# Patient Record
Sex: MALE | Race: BLACK OR AFRICAN AMERICAN | Employment: FULL TIME | URBAN - METROPOLITAN AREA
[De-identification: names, ages, dates, MRNs, and addresses within clinical notes are randomized per-mention and may not be internally consistent; named-entity substitution may affect disease eponyms.]

---

## 2017-01-05 ENCOUNTER — OFFICE VISIT (OUTPATIENT)
Dept: FAMILY MEDICINE CLINIC | Facility: CLINIC | Age: 37
End: 2017-01-05

## 2017-01-05 VITALS
SYSTOLIC BLOOD PRESSURE: 137 MMHG | BODY MASS INDEX: 27.78 KG/M2 | OXYGEN SATURATION: 96 % | TEMPERATURE: 97.4 F | HEIGHT: 75 IN | WEIGHT: 223.4 LBS | HEART RATE: 87 BPM | DIASTOLIC BLOOD PRESSURE: 82 MMHG | RESPIRATION RATE: 16 BRPM

## 2017-01-05 DIAGNOSIS — R10.10 PAIN OF UPPER ABDOMEN: ICD-10-CM

## 2017-01-05 DIAGNOSIS — R10.10 PAIN OF UPPER ABDOMEN: Primary | ICD-10-CM

## 2017-01-05 LAB
BILIRUB UR QL STRIP: NEGATIVE
GLUCOSE UR-MCNC: NEGATIVE MG/DL
KETONES P FAST UR STRIP-MCNC: NORMAL MG/DL
PH UR STRIP: 5.5 [PH] (ref 4.6–8)
PROT UR QL STRIP: NORMAL MG/DL
SP GR UR STRIP: 1.03 (ref 1–1.03)
UA UROBILINOGEN AMB POC: NORMAL (ref 0.2–1)
URINALYSIS CLARITY POC: CLEAR
URINALYSIS COLOR POC: YELLOW
URINE BLOOD POC: NORMAL
URINE LEUKOCYTES POC: NEGATIVE
URINE NITRITES POC: NEGATIVE

## 2017-01-05 RX ORDER — HYDROCODONE BITARTRATE AND ACETAMINOPHEN 5; 325 MG/1; MG/1
1 TABLET ORAL
Qty: 40 TAB | Refills: 0 | Status: SHIPPED | OUTPATIENT
Start: 2017-01-05 | End: 2018-02-27 | Stop reason: ALTCHOICE

## 2017-01-05 NOTE — LETTER
NOTIFICATION RETURN TO WORK / SCHOOL 
 
1/5/2017 10:56 AM 
 
Mr. Leslie Cisneros [address] [place] [id number] To Whom It May Concern: 
 
Leslie Cisneros is currently under the care of Tatiana Mondragon. He will return to work/school on: 1/9/17 If there are questions or concerns please have the patient contact our office.  
 
 
 
Sincerely, 
 
 
Annmarie Friedman NP

## 2017-01-05 NOTE — LETTER
2/15/2017 1:43 PM 
 
Mr. Lilly Bravo [address] [place] [phone] Dear Lilly Bravo: We have been trying to contact you! We have lab and/or imaging results we would like to share with you. Please call our office for results and to update your contact information. Our office number is [phone]. We look forward to hearing from you!  
 
 
Sincerely, 
 
 
Eusebio Mazariegos NP

## 2017-01-05 NOTE — MR AVS SNAPSHOT
Visit Information Date & Time Provider Department Dept. Phone Encounter #  
 1/5/2017 10:15 AM Lurdes Lizarraga NP [facility] [phone] [id number] Upcoming Health Maintenance Date Due Pneumococcal 19-64 Medium Risk (1 of 1 - PPSV23) 1/2/1997 DTaP/Tdap/Td series (1 - Tdap) 1/2/1999 INFLUENZA AGE 9 TO ADULT 8/1/2016 Allergies as of 1/5/2017  Review Complete On: 1/5/2017 By: Vivian Dietz No Known Allergies Current Immunizations  Never Reviewed No immunizations on file. Not reviewed this visit You Were Diagnosed With   
  
 Codes Comments Pain of upper abdomen    -  Primary ICD-10-CM: R10.10 ICD-9-CM: 789.09 Vitals BP Pulse Temp Resp Height(growth percentile) Weight(growth percentile) 137/82 87 97.4 °F (36.3 °C) 16 6' 3\" (1.905 m) 223 lb 6.4 oz (101.3 kg) SpO2 BMI Smoking Status 96% 27.92 kg/m2 Current Every Day Smoker Vitals History BMI and BSA Data Body Mass Index Body Surface Area  
 27.92 kg/m 2 2.32 m 2 Preferred Pharmacy Pharmacy Name Phone [facility] [address] [place] [address] [phone] Your Updated Medication List  
  
   
This list is accurate as of: 1/5/17 10:43 AM.  Always use your most recent med list.  
  
  
  
  
 butalbital-acetaminophen-caffeine -40 mg per tablet Commonly known as:  Ruthie Adams Take 1 Tab by mouth every four (4) hours as needed for Pain. Max Daily Amount: 6 Tabs. HYDROcodone-acetaminophen 5-325 mg per tablet Commonly known as:  Mario Bent Take 1 Tab by mouth every six (6) hours as needed for Pain. Max Daily Amount: 4 Tabs. ibuprofen 800 mg tablet Commonly known as:  MOTRIN Take  by mouth every eight (8) hours as needed. Prescriptions Printed Refills  HYDROcodone-acetaminophen (NORCO) 5-325 mg per tablet 0  
 Sig: Take 1 Tab by mouth every six (6) hours as needed for Pain. Max Daily Amount: 4 Tabs. Class: Print Route: Oral  
  
To-Do List   
 01/05/2017 Lab:  LIPASE   
  
 01/05/2017 Lab:  METABOLIC PANEL, COMPREHENSIVE   
  
 01/12/2017 Imaging:  CT ABD PELV W CONT Referral Information Referral ID Referred By Referred To  
  
 [id number] Lucrecia NAQVI Not Available Visits Status Start Date End Date 1 New Request 1/5/17 1/5/18 If your referral has a status of pending review or denied, additional information will be sent to support the outcome of this decision. Patient Instructions Abdominal Pain: Care Instructions Your Care Instructions Abdominal pain has many possible causes. Some aren't serious and get better on their own in a few days. Others need more testing and treatment. If your pain continues or gets worse, you need to be rechecked and may need more tests to find out what is wrong. You may need surgery to correct the problem. Don't ignore new symptoms, such as fever, nausea and vomiting, urination problems, pain that gets worse, and dizziness. These may be signs of a more serious problem. Your doctor may have recommended a follow-up visit in the next 8 to 12 hours. If you are not getting better, you may need more tests or treatment. The doctor has checked you carefully, but problems can develop later. If you notice any problems or new symptoms, get medical treatment right away. Follow-up care is a key part of your treatment and safety. Be sure to make and go to all appointments, and call your doctor if you are having problems. It's also a good idea to know your test results and keep a list of the medicines you take. How can you care for yourself at home? · Rest until you feel better. · To prevent dehydration, drink plenty of fluids, enough so that your urine is light yellow or clear like water. Choose water and other caffeine-free clear liquids until you feel better. If you have kidney, heart, or liver disease and have to limit fluids, talk with your doctor before you increase the amount of fluids you drink. · If your stomach is upset, eat mild foods, such as rice, dry toast or crackers, bananas, and applesauce. Try eating several small meals instead of two or three large ones. · Wait until 48 hours after all symptoms have gone away before you have spicy foods, alcohol, and drinks that contain caffeine. · Do not eat foods that are high in fat. · Avoid anti-inflammatory medicines such as aspirin, ibuprofen (Advil, Motrin), and naproxen (Aleve). These can cause stomach upset. Talk to your doctor if you take daily aspirin for another health problem. When should you call for help? Call 911 anytime you think you may need emergency care. For example, call if: 
· You passed out (lost consciousness). · You pass maroon or very bloody stools. · You vomit blood or what looks like coffee grounds. · You have new, severe belly pain. Call your doctor now or seek immediate medical care if: 
· Your pain gets worse, especially if it becomes focused in one area of your belly. · You have a new or higher fever. · Your stools are black and look like tar, or they have streaks of blood. · You have unexpected vaginal bleeding. · You have symptoms of a urinary tract infection. These may include: 
¨ Pain when you urinate. ¨ Urinating more often than usual. 
¨ Blood in your urine. · You are dizzy or lightheaded, or you feel like you may faint. Watch closely for changes in your health, and be sure to contact your doctor if: 
· You are not getting better after 1 day (24 hours). Where can you learn more? Go to http://stella-lorenzo.info/. Enter U876 in the search box to learn more about \"Abdominal Pain: Care Instructions. \" Current as of: May 27, 2016 Content Version: 11.1 © 8364-5329 "I [employer]. Care instructions adapted under license by [employer] (which disclaims liability or warranty for this information). If you have questions about a medical condition or this instruction, always ask your healthcare professional. [address] any warranty or liability for your use of this information. Introducing hospitals & HEALTH SERVICES! Lmred Armenta introduces [employer] patient portal. Now you can access parts of your medical record, email your doctor's office, and request medication refills online. 1. In your internet browser, go to [email or web address] 2. Click on the First Time User? Click Here link in the Sign In box. You will see the New Member Sign Up page. 3. Enter your [employer] Access Code exactly as it appears below. You will not need to use this code after youve completed the sign-up process. If you do not sign up before the expiration date, you must request a new code. · [employer] Access Code: [email or web address] Expires: 4/5/2017 10:36 AM 
 
4. Enter the last four digits of your Social Security Number (xxxx) and Date of Birth (mm/dd/yyyy) as indicated and click Submit. You will be taken to the next sign-up page. 5. Create a [employer] ID. This will be your [employer] login ID and cannot be changed, so think of one that is secure and easy to remember. 6. Create a [employer] password. You can change your password at any time. 7. Enter your Password Reset Question and Answer. This can be used at a later time if you forget your password. 8. Enter your e-mail address. You will receive e-mail notification when new information is available in [address] 9. Click Sign Up. You can now view and download portions of your medical record. 10. Click the Download Summary menu link to download a portable copy of your medical information. If you have questions, please visit the Frequently Asked Questions section of the [employer] website. Remember, [employer] is NOT to be used for urgent needs. For medical emergencies, dial 911. Now available from your iPhone and Android! Please provide this summary of care documentation to your next provider. Your primary care clinician is listed as [facility]. If you have any questions after today's visit, please call [phone].

## 2017-01-05 NOTE — PATIENT INSTRUCTIONS

## 2017-01-05 NOTE — PROGRESS NOTES
HISTORY OF PRESENT ILLNESS  Tay Bean is a 42 y.o. male. HPI Comments: Acute care visit with c/o a 4 day h/o abdominal pain. Pain is mainly located in the RUQ with radiation to the back. Reports fever, nausea and vomiting. Denies any change in medication. BM normal, no diarrhea or constipation. Remembers eating a typical breakfast prior to symptoms began (sussage, toast, eggs, fournier). Has not been able to eat much due to his abdominal pain. Had chicken noodle soup last night. Denies any abdominal surgery or h/o kidney stones. Abdominal Pain   The history is provided by the patient. This is a new problem. The current episode started more than 2 days ago. The problem occurs daily. The problem has not changed since onset. Associated symptoms include abdominal pain. He has tried nothing for the symptoms. Review of Systems   Gastrointestinal: Positive for abdominal pain, nausea and vomiting. Past Medical History   Diagnosis Date    HA (headache)     Migraine      No past surgical history on file. Current Outpatient Prescriptions on File Prior to Visit   Medication Sig Dispense Refill    ibuprofen (MOTRIN) 800 mg tablet Take  by mouth every eight (8) hours as needed.  butalbital-acetaminophen-caffeine (FIORICET, ESGIC) -40 mg per tablet Take 1 Tab by mouth every four (4) hours as needed for Pain. Max Daily Amount: 6 Tabs. 30 Tab 6     No current facility-administered medications on file prior to visit. Allergies and Intolerances:   No Known Allergies    Family History:   Family History   Problem Relation Age of Onset    Cancer Mother      breast ca    Heart Disease Father     Diabetes Father     Diabetes Maternal Aunt     Diabetes Maternal Grandmother     Alcohol abuse Maternal Grandfather        Social History:   He  reports that he has been smoking. He has been smoking about 0.25 packs per day.  He has never used smokeless tobacco. He  reports that he does not drink alcohol. Vitals:   Visit Vitals    /82    Pulse 87    Temp 97.4 °F (36.3 °C)    Resp 16    Ht 6' 3\" (1.905 m)    Wt 223 lb 6.4 oz (101.3 kg)    SpO2 96%    BMI 27.92 kg/m2     Body surface area is 2.32 meters squared. Physical Exam   Constitutional: He is oriented to person, place, and time. He appears well-developed and well-nourished. Cardiovascular: Normal rate. Pulmonary/Chest: Effort normal.   Abdominal: Soft. He exhibits no distension. There is no hepatosplenomegaly. There is tenderness in the right upper quadrant. There is no rigidity, no guarding, no tenderness at McBurney's point and negative Myers's sign. Neurological: He is alert and oriented to person, place, and time. Skin: Skin is warm. Psychiatric: He has a normal mood and affect. Nursing note and vitals reviewed. ASSESSMENT and PLAN    ICD-10-CM ICD-9-CM    1. Pain of upper abdomen R10.10 789.09 AMB POC URINALYSIS DIP STICK AUTO W/O MICRO      CBC WITH AUTOMATED DIFF      METABOLIC PANEL, COMPREHENSIVE      LIPASE      CT ABD PELV W CONT      HYDROcodone-acetaminophen (NORCO) 5-325 mg per tablet     -  Urinalysis normal/unramarkable. - Discussed possible gallstones and kidney stones with patient. Will order CT abd pelvis to r/o gallstones. Hydrocodone every 6 hrs PRN. Follow-up Disposition:  Return if symptoms worsen or fail to improve.  lab results and schedule of future lab studies reviewed with patient  reviewed medications and side effects in detail  radiology results and schedule of future radiology studies reviewed with patient    - Alarm signals discussed. ER precautions  - Plan of care reviewed with patient. Understanding verbalized and they are in agreement with plan of care.

## 2017-01-06 LAB
ALBUMIN SERPL-MCNC: 5.1 G/DL (ref 3.5–5.5)
ALBUMIN/GLOB SERPL: 2.1 {RATIO} (ref 1.1–2.5)
ALP SERPL-CCNC: 57 IU/L (ref 39–117)
ALT SERPL-CCNC: 34 IU/L (ref 0–44)
AST SERPL-CCNC: 18 IU/L (ref 0–40)
BASOPHILS # BLD AUTO: 0 X10E3/UL (ref 0–0.2)
BASOPHILS NFR BLD AUTO: 0 %
BILIRUB SERPL-MCNC: 1.9 MG/DL (ref 0–1.2)
BUN SERPL-MCNC: 21 MG/DL (ref 6–20)
BUN/CREAT SERPL: 23 (ref 8–19)
CALCIUM SERPL-MCNC: 10.3 MG/DL (ref 8.7–10.2)
CHLORIDE SERPL-SCNC: 104 MMOL/L (ref 96–106)
CO2 SERPL-SCNC: 20 MMOL/L (ref 18–29)
CREAT SERPL-MCNC: 0.93 MG/DL (ref 0.76–1.27)
EOSINOPHIL # BLD AUTO: 0.1 X10E3/UL (ref 0–0.4)
EOSINOPHIL NFR BLD AUTO: 1 %
ERYTHROCYTE [DISTWIDTH] IN BLOOD BY AUTOMATED COUNT: 13 % (ref 12.3–15.4)
GLOBULIN SER CALC-MCNC: 2.4 G/DL (ref 1.5–4.5)
GLUCOSE SERPL-MCNC: 115 MG/DL (ref 65–99)
HCT VFR BLD AUTO: 43.9 % (ref 37.5–51)
HGB BLD-MCNC: 14.4 G/DL (ref 12.6–17.7)
IMM GRANULOCYTES # BLD: 0 X10E3/UL (ref 0–0.1)
IMM GRANULOCYTES NFR BLD: 0 %
LIPASE SERPL-CCNC: 20 U/L (ref 0–59)
LYMPHOCYTES # BLD AUTO: 2.6 X10E3/UL (ref 0.7–3.1)
LYMPHOCYTES NFR BLD AUTO: 35 %
MCH RBC QN AUTO: 31.2 PG (ref 26.6–33)
MCHC RBC AUTO-ENTMCNC: 32.8 G/DL (ref 31.5–35.7)
MCV RBC AUTO: 95 FL (ref 79–97)
MONOCYTES # BLD AUTO: 0.8 X10E3/UL (ref 0.1–0.9)
MONOCYTES NFR BLD AUTO: 11 %
NEUTROPHILS # BLD AUTO: 3.9 X10E3/UL (ref 1.4–7)
NEUTROPHILS NFR BLD AUTO: 53 %
PLATELET # BLD AUTO: 279 X10E3/UL (ref 150–379)
POTASSIUM SERPL-SCNC: 4.2 MMOL/L (ref 3.5–5.2)
PROT SERPL-MCNC: 7.5 G/DL (ref 6–8.5)
RBC # BLD AUTO: 4.62 X10E6/UL (ref 4.14–5.8)
SODIUM SERPL-SCNC: 145 MMOL/L (ref 134–144)
WBC # BLD AUTO: 7.4 X10E3/UL (ref 3.4–10.8)

## 2017-01-19 ENCOUNTER — TELEPHONE (OUTPATIENT)
Dept: FAMILY MEDICINE CLINIC | Facility: CLINIC | Age: 37
End: 2017-01-19

## 2017-01-19 NOTE — TELEPHONE ENCOUNTER
Called patient at [phone] (home)  (non-secure line) and did not leave a detailed message. Patient needs to be informed of lab results.

## 2017-01-19 NOTE — TELEPHONE ENCOUNTER
----- Message from Rubio Delgado NP sent at 1/16/2017 11:01 AM EST -----  Unremarkable labs. pls notify patient.  Thanks

## 2017-06-19 ENCOUNTER — OFFICE VISIT (OUTPATIENT)
Dept: FAMILY MEDICINE CLINIC | Age: 37
End: 2017-06-19

## 2017-06-19 VITALS
HEART RATE: 64 BPM | HEIGHT: 75 IN | DIASTOLIC BLOOD PRESSURE: 100 MMHG | WEIGHT: 224.6 LBS | TEMPERATURE: 98.1 F | OXYGEN SATURATION: 98 % | SYSTOLIC BLOOD PRESSURE: 128 MMHG | RESPIRATION RATE: 24 BRPM | BODY MASS INDEX: 27.93 KG/M2

## 2017-06-19 DIAGNOSIS — K21.9 GASTROESOPHAGEAL REFLUX DISEASE WITHOUT ESOPHAGITIS: Primary | ICD-10-CM

## 2017-06-19 RX ORDER — PANTOPRAZOLE SODIUM 40 MG/1
40 TABLET, DELAYED RELEASE ORAL DAILY
Qty: 30 TAB | Refills: 5 | Status: SHIPPED | OUTPATIENT
Start: 2017-06-19 | End: 2018-02-27 | Stop reason: SDUPTHER

## 2017-06-19 NOTE — LETTER
NOTIFICATION OF RETURN TO WORK / SCHOOL 
 
6/19/2017 3:07 PM 
 
Mr. Luke Gabriel [address] [place] [phone] Count includes the [facility] To Whom It May Concern: 
 
Luke Gabriel was under the care of [address] [place] [address] from 6-19-17 thru 6-20-17. He will be able to return to work/school on 6-2117 If there are questions or concerns please have the patient contact our office. Sincerely, Bc Vale NP

## 2017-06-19 NOTE — PROGRESS NOTES
HISTORY OF PRESENT ILLNESS  Nicola Mercedes is a 42 y.o. male. Pt presents today with 2 weeks of stomach pains. Pt avoids certain foods. He denies fever, chills, constipation or diarrhea. He has not done anything new or eaten anything new. He does do heavy lifting of laundry. Abdominal Pain   The history is provided by the patient. The current episode started more than 1 week ago. The problem has not changed since onset. Associated symptoms include chest pain and abdominal pain. Pertinent negatives include no headaches and no shortness of breath. Nothing relieves the symptoms. He has tried nothing for the symptoms. Chest Pain    Associated symptoms include abdominal pain. Pertinent negatives include no fever, no headaches, no nausea, no shortness of breath and no vomiting. Review of Systems   Constitutional: Negative for chills and fever. Respiratory: Negative for shortness of breath. Cardiovascular: Positive for chest pain. Gastrointestinal: Positive for abdominal pain. Negative for constipation, diarrhea, heartburn, nausea and vomiting. Skin: Negative. Neurological: Negative for headaches. Physical Exam   Constitutional: He appears well-developed. No distress. Neck: Neck supple. Cardiovascular: Normal rate, regular rhythm and normal heart sounds. No murmur heard. Pulmonary/Chest: Effort normal and breath sounds normal. No respiratory distress. He has no wheezes. He has no rales. Abdominal: Soft. He exhibits no distension and no mass. There is tenderness. There is no rebound and no guarding. Musculoskeletal: Normal range of motion. ASSESSMENT and PLAN    ICD-10-CM ICD-9-CM    1. Gastroesophageal reflux disease without esophagitis K21.9 530.81 pantoprazole (PROTONIX) 40 mg tablet       PLAN:  We discussed his symptoms and the causes. We discussed the symptoms of GERD and treatment options.     Pt is to call if symptoms persist or wors

## 2017-06-19 NOTE — MR AVS SNAPSHOT
Visit Information Date & Time Provider Department Dept. Phone Encounter #  
 6/19/2017  2:30 PM Maria M Candelaria  [facility] [phone] [id number] Upcoming Health Maintenance Date Due DTaP/Tdap/Td series (1 - Tdap) 1/2/1999 INFLUENZA AGE 9 TO ADULT 8/1/2017 Allergies as of 6/19/2017  Review Complete On: 6/19/2017 By: Maria M Candelaria NP No Known Allergies Current Immunizations  Never Reviewed No immunizations on file. Not reviewed this visit You Were Diagnosed With   
  
 Codes Comments Gastroesophageal reflux disease without esophagitis    -  Primary ICD-10-CM: K21.9 ICD-9-CM: 530.81 Vitals BP Pulse Temp Resp Height(growth percentile) Weight(growth percentile) (!) 128/100 (BP 1 Location: Left arm, BP Patient Position: Sitting) 64 98.1 °F (36.7 °C) (Oral) 24 6' 3\" (1.905 m) 224 lb 9.6 oz (101.9 kg) SpO2 BMI Smoking Status 98% 28.07 kg/m2 Current Every Day Smoker Vitals History BMI and BSA Data Body Mass Index Body Surface Area 28.07 kg/m 2 2.32 m 2 Preferred Pharmacy Pharmacy Name Phone [facility] [address] [phone] Your Updated Medication List  
  
   
This list is accurate as of: 6/19/17  3:12 PM.  Always use your most recent med list.  
  
  
  
  
 HYDROcodone-acetaminophen 5-325 mg per tablet Commonly known as:  Shaniqua Mandril Take 1 Tab by mouth every six (6) hours as needed for Pain. Max Daily Amount: 4 Tabs. pantoprazole 40 mg tablet Commonly known as:  PROTONIX Take 1 Tab by mouth daily. Prescriptions Sent to Pharmacy Refills  
 pantoprazole (PROTONIX) 40 mg tablet 5 Sig: Take 1 Tab by mouth daily. Class: Normal  
 Pharmacy: [facility] [address] [facility] #: [phone] Route: Oral  
  
Introducing [facility] SERVICES! Nila Hernandez introduces "[employer]" patient portal. Now you can access parts of your medical record, email your doctor's office, and request medication refills online. 1. In your internet browser, go to [email or web address] 2. Click on the First Time User? Click Here link in the Sign In box. You will see the New Member Sign Up page. 3. Enter your "[employer]" Access Code exactly as it appears below. You will not need to use this code after youve completed the sign-up process. If you do not sign up before the expiration date, you must request a new code. · "[employer]" Access Code: [email or web address] Expires: 9/17/2017  3:03 PM 
 
4. Enter the last four digits of your Social Security Number (xxxx) and Date of Birth (mm/dd/yyyy) as indicated and click Submit. You will be taken to the next sign-up page. 5. Create a "[employer]" ID. This will be your "[employer]" login ID and cannot be changed, so think of one that is secure and easy to remember. 6. Create a "[employer]" password. You can change your password at any time. 7. Enter your Password Reset Question and Answer. This can be used at a later time if you forget your password. 8. Enter your e-mail address. You will receive e-mail notification when new information is available in [address] 9. Click Sign Up. You can now view and download portions of your medical record. 10. Click the Download Summary menu link to download a portable copy of your medical information. If you have questions, please visit the Frequently Asked Questions section of the "[employer]" website. Remember, "[employer]" is NOT to be used for urgent needs. For medical emergencies, dial 911. Now available from your iPhone and Android! Please provide this summary of care documentation to your next provider. Your primary care clinician is listed as [address]. If you have any questions after today's visit, please call [phone].

## 2017-06-19 NOTE — PROGRESS NOTES
1. Have you been to the ER, urgent care clinic since your last visit? Hospitalized since your last visit? No    2. Have you seen or consulted any other health care providers outside of the [address] [place] [address] since your last visit? Include any pap smears or colon screening.  No

## 2018-02-27 ENCOUNTER — OFFICE VISIT (OUTPATIENT)
Dept: FAMILY MEDICINE CLINIC | Age: 38
End: 2018-02-27

## 2018-02-27 VITALS
RESPIRATION RATE: 16 BRPM | WEIGHT: 239 LBS | HEART RATE: 66 BPM | BODY MASS INDEX: 29.72 KG/M2 | OXYGEN SATURATION: 97 % | HEIGHT: 75 IN | DIASTOLIC BLOOD PRESSURE: 70 MMHG | TEMPERATURE: 97.8 F | SYSTOLIC BLOOD PRESSURE: 110 MMHG

## 2018-02-27 DIAGNOSIS — M79.671 PAIN IN BOTH FEET: ICD-10-CM

## 2018-02-27 DIAGNOSIS — G43.C0 PERIODIC HEADACHE SYNDROME, NOT INTRACTABLE: ICD-10-CM

## 2018-02-27 DIAGNOSIS — M79.672 PAIN IN BOTH FEET: ICD-10-CM

## 2018-02-27 DIAGNOSIS — R07.9 CHEST PAIN, UNSPECIFIED TYPE: Primary | ICD-10-CM

## 2018-02-27 RX ORDER — PANTOPRAZOLE SODIUM 40 MG/1
40 TABLET, DELAYED RELEASE ORAL DAILY
Qty: 30 TAB | Refills: 6 | Status: SHIPPED | OUTPATIENT
Start: 2018-02-27 | End: 2018-09-18 | Stop reason: SDUPTHER

## 2018-02-27 RX ORDER — BUTALBITAL, ACETAMINOPHEN AND CAFFEINE 50; 325; 40 MG/1; MG/1; MG/1
1 TABLET ORAL
Qty: 30 TAB | Refills: 3 | Status: SHIPPED | OUTPATIENT
Start: 2018-02-27 | End: 2020-10-08

## 2018-02-27 RX ORDER — ACETAMINOPHEN 500 MG
TABLET ORAL
COMMUNITY
End: 2018-02-27 | Stop reason: SDUPTHER

## 2018-02-27 NOTE — MR AVS SNAPSHOT
Shawnia Cady 
 
 
 [address], [place] [address] [address] [facility] [address] 
[phone] Patient: Amanda Araujo MRN: [id number] [facility]:[birth year] Visit Information Date & Time Provider Department Dept. Phone Encounter #  
 2/27/2018  8:40 AM Chandan Mendenhall, [address] [place] [address] [phone] [id number] Follow-up Instructions Return in about 8 weeks (around 4/24/2018) for ha/cp/foot pain. Upcoming Health Maintenance Date Due DTaP/Tdap/Td series (1 - Tdap) 1/2/1999 Influenza Age 5 to Adult 8/1/2017 Allergies as of 2/27/2018  Review Complete On: 2/27/2018 By: Maine Rousseau LPN No Known Allergies Current Immunizations  Never Reviewed No immunizations on file. Not reviewed this visit You Were Diagnosed With   
  
 Codes Comments Chest pain, unspecified type    -  Primary ICD-10-CM: R07.9 ICD-9-CM: 786.50 Periodic headache syndrome, not intractable     ICD-10-CM: G43. C0 ICD-9-CM: 346.20 Pain in both feet     ICD-10-CM: M79.671, O75.466 ICD-9-CM: 729.5 Vitals BP Pulse Temp Resp Height(growth percentile) Weight(growth percentile) 110/70 66 97.8 °F (36.6 °C) (Oral) 16 6' 3\" (1.905 m) 239 lb (108.4 kg) SpO2 BMI Smoking Status 97% 29.87 kg/m2 Current Some Day Smoker Vitals History BMI and BSA Data Body Mass Index Body Surface Area  
 29.87 kg/m 2 2.4 m 2 Preferred Pharmacy Pharmacy Name Phone [facility] [address] [place] [address] [phone] Your Updated Medication List  
  
   
This list is accurate as of 2/27/18 10:23 AM.  Always use your most recent med list.  
  
  
  
  
 butalbital-acetaminophen-caffeine -40 mg per tablet Commonly known as:  Flonnie Min Take 1 Tab by mouth every six (6) hours as needed for Pain.  
  
 pantoprazole 40 mg tablet Commonly known as:  PROTONIX Take 1 Tab by mouth daily. Prescriptions Printed Refills  
 butalbital-acetaminophen-caffeine (FIORICET, ESGIC) -40 mg per tablet 3 Sig: Take 1 Tab by mouth every six (6) hours as needed for Pain. Class: Print Route: Oral  
  
Prescriptions Sent to Pharmacy Refills  
 pantoprazole (PROTONIX) 40 mg tablet 6 Sig: Take 1 Tab by mouth daily. Class: Normal  
 Pharmacy: [facility] [address] DONALD HENLEY [address] [place] [address], [address] [place] [address] #: [phone] Route: Oral  
  
We Performed the Following AMB POC EKG ROUTINE W/ 12 LEADS, INTER & REP [53585 CPT(R)] Follow-up Instructions Return in about 8 weeks (around 4/24/2018) for ha/cp/foot pain. To-Do List   
 03/06/2018 ECG:  STRESS TEST CARDIAC Patient Instructions Chest Pain: Care Instructions Your Care Instructions There are many things that can cause chest pain. Some are not serious and will get better on their own in a few days. But some kinds of chest pain need more testing and treatment. Your doctor may have recommended a follow-up visit in the next 8 to 12 hours. If you are not getting better, you may need more tests or treatment. Even though your doctor has released you, you still need to watch for any problems. The doctor carefully checked you, but sometimes problems can develop later. If you have new symptoms or if your symptoms do not get better, get medical care right away. If you have worse or different chest pain or pressure that lasts more than 5 minutes or you passed out (lost consciousness), call 911 or seek other emergency help right away. A medical visit is only one step in your treatment. Even if you feel better, you still need to do what your doctor recommends, such as going to all suggested follow-up appointments and taking medicines exactly as directed. This will help you recover and help prevent future problems. How can you care for yourself at home? · Rest until you feel better. · Take your medicine exactly as prescribed. Call your doctor if you think you are having a problem with your medicine. · Do not drive after taking a prescription pain medicine. When should you call for help? Call 911 if: 
? · You passed out (lost consciousness). ? · You have severe difficulty breathing. ? · You have symptoms of a heart attack. These may include: ¨ Chest pain or pressure, or a strange feeling in your chest. 
¨ Sweating. ¨ Shortness of breath. ¨ Nausea or vomiting. ¨ Pain, pressure, or a strange feeling in your back, neck, jaw, or upper belly or in one or both shoulders or arms. ¨ Lightheadedness or sudden weakness. ¨ A fast or irregular heartbeat. After you call 911, the  may tell you to chew 1 adult-strength or 2 to 4 low-dose aspirin. Wait for an ambulance. Do not try to drive yourself. ?Call your doctor today if: 
? · You have any trouble breathing. ? · Your chest pain gets worse. ? · You are dizzy or lightheaded, or you feel like you may faint. ? · You are not getting better as expected. ? · You are having new or different chest pain. Where can you learn more? Go to http://stella-lorenzo.info/. Enter A120 in the search box to learn more about \"Chest Pain: Care Instructions. \" Current as of: March 20, 2017 Content Version: 11.4 © 4617-8607 View [employer] Chew. Care instructions adapted under license by [employer] (which disclaims liability or warranty for this information). If you have questions about a medical condition or this instruction, always ask your healthcare professional. [address] any warranty or liability for your use of this information. Introducing [facility] & HEALTH SERVICES! Alissa Fu introduces [employer] patient portal. Now you can access parts of your medical record, email your doctor's office, and request medication refills online. 1. In your internet browser, go to [email or web address] 2. Click on the First Time User? Click Here link in the Sign In box. You will see the New Member Sign Up page. 3. Enter your [employer] Access Code exactly as it appears below. You will not need to use this code after youve completed the sign-up process. If you do not sign up before the expiration date, you must request a new code. · [employer] Access Code: [email or web address] Expires: 5/28/2018  8:46 AM 
 
4. Enter the last four digits of your Social Security Number (xxxx) and Date of Birth (mm/dd/yyyy) as indicated and click Submit. You will be taken to the next sign-up page. 5. Create a [employer] ID. This will be your [employer] login ID and cannot be changed, so think of one that is secure and easy to remember. 6. Create a [employer] password. You can change your password at any time. 7. Enter your Password Reset Question and Answer. This can be used at a later time if you forget your password. 8. Enter your e-mail address. You will receive e-mail notification when new information is available in [address] 9. Click Sign Up. You can now view and download portions of your medical record. 10. Click the Download Summary menu link to download a portable copy of your medical information. If you have questions, please visit the Frequently Asked Questions section of the [employer] website. Remember, [employer] is NOT to be used for urgent needs. For medical emergencies, dial 911. Now available from your iPhone and Android! Please provide this summary of care documentation to your next provider. Your primary care clinician is listed as [address]. If you have any questions after today's visit, please call [phone].

## 2018-02-27 NOTE — PATIENT INSTRUCTIONS
Chest Pain: Care Instructions  Your Care Instructions    There are many things that can cause chest pain. Some are not serious and will get better on their own in a few days. But some kinds of chest pain need more testing and treatment. Your doctor may have recommended a follow-up visit in the next 8 to 12 hours. If you are not getting better, you may need more tests or treatment. Even though your doctor has released you, you still need to watch for any problems. The doctor carefully checked you, but sometimes problems can develop later. If you have new symptoms or if your symptoms do not get better, get medical care right away. If you have worse or different chest pain or pressure that lasts more than 5 minutes or you passed out (lost consciousness), call 911 or seek other emergency help right away. A medical visit is only one step in your treatment. Even if you feel better, you still need to do what your doctor recommends, such as going to all suggested follow-up appointments and taking medicines exactly as directed. This will help you recover and help prevent future problems. How can you care for yourself at home? · Rest until you feel better. · Take your medicine exactly as prescribed. Call your doctor if you think you are having a problem with your medicine. · Do not drive after taking a prescription pain medicine. When should you call for help? Call 911 if:  ? · You passed out (lost consciousness). ? · You have severe difficulty breathing. ? · You have symptoms of a heart attack. These may include:  ¨ Chest pain or pressure, or a strange feeling in your chest.  ¨ Sweating. ¨ Shortness of breath. ¨ Nausea or vomiting. ¨ Pain, pressure, or a strange feeling in your back, neck, jaw, or upper belly or in one or both shoulders or arms. ¨ Lightheadedness or sudden weakness. ¨ A fast or irregular heartbeat.   After you call 911, the  may tell you to chew 1 adult-strength or 2 to 4 low-dose aspirin. Wait for an ambulance. Do not try to drive yourself. ?Call your doctor today if:  ? · You have any trouble breathing. ? · Your chest pain gets worse. ? · You are dizzy or lightheaded, or you feel like you may faint. ? · You are not getting better as expected. ? · You are having new or different chest pain. Where can you learn more? Go to http://stella-lorenzo.info/. Enter A120 in the search box to learn more about \"Chest Pain: Care Instructions. \"  Current as of: March 20, 2017  Content Version: 11.4  © 8933-2604 [employer]. Care instructions adapted under license by [employer] (which disclaims liability or warranty for this information). If you have questions about a medical condition or this instruction, always ask your healthcare professional. [address] any warranty or liability for your use of this information.

## 2018-02-27 NOTE — LETTER
NOTIFICATION RETURN TO WORK / SCHOOL 
 
2/27/2018 10:19 AM 
 
Mr. Clarice Khan [address] [place] [phone] To Whom It May Concern: 
 
Clarice Khan [facility] the [facility] He will return to work/school on: 3/5/2018 If there are questions or concerns please have the patient contact our office. Sincerely, Enriqueta Fonseca MD

## 2018-02-27 NOTE — PROGRESS NOTES
HISTORY OF PRESENT ILLNESS  Blu Borrego is a 34 y.o. male. HPI  Patient is here today for evaluation and treatment of: Generalized Body Aches    Generalized Body Aches: Pt state he has has had HA, Chest Pain, Feet pain. 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 He has seen podiatry and has been advised that he needs surgery in the right foot. He feels a lump at the bottom of the foot. Pt has callouses    Head aches are posterior; he may have photosensitivity , no phonophobia; there is some diplopia and blurred vision at times. He has no associated vomiting. He may have pain when he moves his head. He has been on fioricet before. Med helped; would like a refill       Chest pain. -  Nancy is sharp; radiates from the left chest across the chest. He has indigestion at times. He may have epigastric pain. Certain foods will make him have Chest pain;     Pt requests a work note. Current Outpatient Prescriptions:     pantoprazole (PROTONIX) 40 mg tablet, Take 1 Tab by mouth daily. , Disp: 30 Tab, Rfl: 6    butalbital-acetaminophen-caffeine (FIORICET, ESGIC) -40 mg per tablet, Take 1 Tab by mouth every six (6) hours as needed for Pain., Disp: 30 Tab, Rfl: 3      PMH,  Meds, Allergies, Family History, Social history reviewed      Review of Systems   Constitutional: Negative for chills and fever. Respiratory: Negative for shortness of breath and wheezing. Cardiovascular: Negative for chest pain and palpitations. Physical Exam   Constitutional: He is oriented to person, place, and time. He appears well-developed and well-nourished. No distress. Cardiovascular: Normal rate and regular rhythm. Exam reveals no gallop and no friction rub. No murmur heard. Pulmonary/Chest: Breath sounds normal. No respiratory distress. He has no wheezes. Musculoskeletal: He exhibits no edema. Neurological: He is alert and oriented to person, place, and time. He has normal reflexes. Nursing note and vitals reviewed. + callouses on the plantar surface of foot.   ekg - reviewed  ASSESSMENT and PLAN    ICD-10-CM ICD-9-CM    1. Chest pain, unspecified type R07.9 786.50 AMB POC EKG ROUTINE W/ 12 LEADS, INTER & REP      STRESS TEST CARDIAC   2. Periodic headache syndrome, not intractable G43. C0 346.20    3. Pain in both feet M79.671 729.5     M79.672         As above, all new   treatment plan as listed below  Orders Placed This Encounter    AMB POC EKG ROUTINE W/ 12 LEADS, INTER & REP    STRESS TEST CARDIAC    DISCONTD: acetaminophen (TYLENOL EXTRA STRENGTH) 500 mg tablet    pantoprazole (PROTONIX) 40 mg tablet    butalbital-acetaminophen-caffeine (FIORICET, ESGIC) -40 mg per tablet     Follow-up Disposition:  Return in about 8 weeks (around 4/24/2018) for ha/cp/foot pain. An After Visit Summary was printed and given to the patient. This has been fully explained to the patient, who indicates understanding.   Work note

## 2018-02-27 NOTE — PROGRESS NOTES
1. Have you been to the ER, urgent care clinic since your last visit? Hospitalized since your last visit? No    2. Have you seen or consulted any other health care providers outside of the [address] [place] [address] since your last visit? Include any pap smears or colon screening.  No

## 2018-03-08 ENCOUNTER — TELEPHONE (OUTPATIENT)
Dept: FAMILY MEDICINE CLINIC | Age: 38
End: 2018-03-08

## 2018-03-08 DIAGNOSIS — M79.672 PAIN IN BOTH FEET: Primary | ICD-10-CM

## 2018-03-08 DIAGNOSIS — M79.671 PAIN IN BOTH FEET: Primary | ICD-10-CM

## 2018-03-08 NOTE — TELEPHONE ENCOUNTER
Pt is calling for a referral to podiatry for lt and rt foot pain. Last referral was done in 2016 for rt foot pain. Pt needs a new referral for bilat foot pain.      Pt phone [phone]

## 2018-09-18 ENCOUNTER — OFFICE VISIT (OUTPATIENT)
Dept: FAMILY MEDICINE CLINIC | Age: 38
End: 2018-09-18

## 2018-09-18 VITALS
WEIGHT: 247 LBS | TEMPERATURE: 98.2 F | SYSTOLIC BLOOD PRESSURE: 133 MMHG | RESPIRATION RATE: 16 BRPM | BODY MASS INDEX: 30.71 KG/M2 | HEART RATE: 87 BPM | OXYGEN SATURATION: 96 % | HEIGHT: 75 IN | DIASTOLIC BLOOD PRESSURE: 83 MMHG

## 2018-09-18 DIAGNOSIS — M25.512 LEFT SHOULDER PAIN, UNSPECIFIED CHRONICITY: ICD-10-CM

## 2018-09-18 DIAGNOSIS — K21.9 GASTROESOPHAGEAL REFLUX DISEASE WITHOUT ESOPHAGITIS: Primary | ICD-10-CM

## 2018-09-18 DIAGNOSIS — M62.830 MUSCLE SPASM OF BACK: ICD-10-CM

## 2018-09-18 RX ORDER — NAPROXEN SODIUM 550 MG/1
550 TABLET ORAL 2 TIMES DAILY WITH MEALS
Qty: 30 TAB | Refills: 1 | Status: SHIPPED | OUTPATIENT
Start: 2018-09-18 | End: 2019-02-16 | Stop reason: SDUPTHER

## 2018-09-18 RX ORDER — PANTOPRAZOLE SODIUM 40 MG/1
40 TABLET, DELAYED RELEASE ORAL DAILY
Qty: 90 TAB | Refills: 1 | Status: SHIPPED | OUTPATIENT
Start: 2018-09-18 | End: 2020-10-08

## 2018-09-18 RX ORDER — ORPHENADRINE CITRATE 100 MG/1
100 TABLET, EXTENDED RELEASE ORAL 2 TIMES DAILY
Qty: 30 TAB | Refills: 1 | Status: SHIPPED | OUTPATIENT
Start: 2018-09-18 | End: 2019-02-16 | Stop reason: SDUPTHER

## 2018-09-18 NOTE — PATIENT INSTRUCTIONS

## 2018-09-18 NOTE — PROGRESS NOTES
HISTORY OF PRESENT ILLNESS  Cassy Carlos is a 34 y.o. male. Patient presents for shoulder pain. HPI  Pt thought it was from sleeping on it wrong. This started 3 weeks ago. 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 Pt states soda, orange juice and tomatoes cause his stomach to hurt. He stop drinking soda but giving up tomatoes and orange juice is hard. He would like a refill on his stomach medication. Review of Systems   Constitutional: Negative. Gastrointestinal: Positive for abdominal pain (epigastric) and heartburn. Negative for nausea and vomiting. Musculoskeletal: Positive for joint pain (left shoulder). Visit Vitals    /83 (BP 1 Location: Left arm)    Pulse 87    Temp 98.2 °F (36.8 °C) (Oral)    Resp 16    Ht 6' 3\" (1.905 m)    Wt 247 lb (112 kg)    SpO2 96%    BMI 30.87 kg/m2     Physical Exam   Constitutional: He is oriented to person, place, and time. He appears well-developed. No distress. Neck: Normal range of motion. Neck supple. Cardiovascular: Normal rate, regular rhythm and normal heart sounds. No murmur heard. Pulmonary/Chest: Effort normal and breath sounds normal. No respiratory distress. He has no wheezes. He has no rales. Musculoskeletal: He exhibits tenderness (left shoulder). Right shoulder: Normal.        Left shoulder: He exhibits decreased range of motion, tenderness, spasm and decreased strength. Cervical back: He exhibits spasm. Neurological: He is alert and oriented to person, place, and time. No cranial nerve deficit. left shoulder positive impingement sign. ASSESSMENT and PLAN    ICD-10-CM ICD-9-CM    1. Gastroesophageal reflux disease without esophagitis K21.9 530.81 pantoprazole (PROTONIX) 40 mg tablet   2. Left shoulder pain, unspecified chronicity M25.512 719.41 naproxen sodium (ANAPROX DS) 550 mg tablet      MRI SHOULDER LT WO CONT   3.  Muscle spasm of back M62.830 724.8 orphenadrine citrate (NORFLEX) 100 mg sr tablet     PLAN:  We discussed risk and benefits of PPIs    We discussed his shoulder and patient was asked to take the anaprox and norflex twice a day with food for 7-10days. Pt is to call with any concerns. Pt was given after visit summary.

## 2018-09-18 NOTE — PROGRESS NOTES
Pt is here for L shoulder pain x 3 weeks. Denies injury. 1. Have you been to the ER, urgent care clinic since your last visit? Hospitalized since your last visit? No    2. Have you seen or consulted any other health care providers outside of the [address] [place] [address] since your last visit? Include any pap smears or colon screening.  No

## 2018-09-23 ENCOUNTER — HOSPITAL ENCOUNTER (OUTPATIENT)
Age: 38
Discharge: HOME | End: 2018-09-23
Attending: NURSE PRACTITIONER
Payer: COMMERCIAL

## 2018-09-23 DIAGNOSIS — M25.512 LEFT SHOULDER PAIN, UNSPECIFIED CHRONICITY: ICD-10-CM

## 2018-09-23 PROCEDURE — 73221 MRI JOINT UPR EXTREM W/O DYE: CPT

## 2018-09-24 DIAGNOSIS — M25.512 LEFT SHOULDER PAIN, UNSPECIFIED CHRONICITY: ICD-10-CM

## 2018-09-24 DIAGNOSIS — R93.89 ABNORMAL FINDINGS ON IMAGING TEST: Primary | ICD-10-CM

## 2019-02-15 ENCOUNTER — TELEPHONE (OUTPATIENT)
Dept: FAMILY MEDICINE CLINIC | Age: 39
End: 2019-02-15

## 2019-02-16 ENCOUNTER — OFFICE VISIT (OUTPATIENT)
Dept: FAMILY MEDICINE CLINIC | Age: 39
End: 2019-02-16

## 2019-02-16 VITALS
OXYGEN SATURATION: 97 % | HEART RATE: 75 BPM | BODY MASS INDEX: 30.71 KG/M2 | SYSTOLIC BLOOD PRESSURE: 136 MMHG | DIASTOLIC BLOOD PRESSURE: 81 MMHG | WEIGHT: 247 LBS | HEIGHT: 75 IN | TEMPERATURE: 98 F | RESPIRATION RATE: 20 BRPM

## 2019-02-16 DIAGNOSIS — M62.830 MUSCLE SPASM OF BACK: ICD-10-CM

## 2019-02-16 DIAGNOSIS — R93.89 ABNORMAL FINDING ON IMAGING: ICD-10-CM

## 2019-02-16 DIAGNOSIS — M54.2 NECK PAIN: ICD-10-CM

## 2019-02-16 DIAGNOSIS — M25.512 LEFT SHOULDER PAIN, UNSPECIFIED CHRONICITY: Primary | ICD-10-CM

## 2019-02-16 RX ORDER — NAPROXEN SODIUM 550 MG/1
550 TABLET ORAL 2 TIMES DAILY WITH MEALS
Qty: 30 TAB | Refills: 1 | Status: SHIPPED | OUTPATIENT
Start: 2019-02-16 | End: 2019-03-02

## 2019-02-16 RX ORDER — ORPHENADRINE CITRATE 100 MG/1
100 TABLET, EXTENDED RELEASE ORAL 2 TIMES DAILY
Qty: 30 TAB | Refills: 1 | Status: SHIPPED | OUTPATIENT
Start: 2019-02-16 | End: 2020-10-08

## 2019-02-16 NOTE — PATIENT INSTRUCTIONS
Shoulder Pain: Care Instructions  Your Care Instructions    You can hurt your shoulder by using it too much during an activity, such as fishing or baseball. It can also happen as part of the everyday wear and tear of getting older. Shoulder injuries can be slow to heal, but your shoulder should get better with time. Your doctor may recommend a sling to rest your shoulder. If you have injured your shoulder, you may need testing and treatment. Follow-up care is a key part of your treatment and safety. Be sure to make and go to all appointments, and call your doctor if you are having problems. It's also a good idea to know your test results and keep a list of the medicines you take. How can you care for yourself at home? · Take pain medicines exactly as directed. ? If the doctor gave you a prescription medicine for pain, take it as prescribed. ? If you are not taking a prescription pain medicine, ask your doctor if you can take an over-the-counter medicine. ? Do not take two or more pain medicines at the same time unless the doctor told you to. Many pain medicines contain acetaminophen, which is Tylenol. Too much acetaminophen (Tylenol) can be harmful. · If your doctor recommends that you wear a sling, use it as directed. Do not take it off before your doctor tells you to. · Put ice or a cold pack on the sore area for 10 to 20 minutes at a time. Put a thin cloth between the ice and your skin. · If there is no swelling, you can put moist heat, a heating pad, or a warm cloth on your shoulder. Some doctors suggest alternating between hot and cold. · Rest your shoulder for a few days. If your doctor recommends it, you can then begin gentle exercise of the shoulder, but do not lift anything heavy. When should you call for help? Call 911 anytime you think you may need emergency care. For example, call if:    · You have chest pain or pressure. This may occur with:  ? Sweating. ?  Shortness of breath. ? Nausea or vomiting. ? Pain that spreads from the chest to the neck, jaw, or one or both shoulders or arms. ? Dizziness or lightheadedness. ? A fast or uneven pulse. After calling 911, chew 1 adult-strength aspirin. Wait for an ambulance. Do not try to drive yourself.     · Your arm or hand is cool or pale or changes color.    Call your doctor now or seek immediate medical care if:    · You have signs of infection, such as:  ? Increased pain, swelling, warmth, or redness in your shoulder. ? Red streaks leading from a place on your shoulder. ? Pus draining from an area of your shoulder. ? Swollen lymph nodes in your neck, armpits, or groin. ? A fever.    Watch closely for changes in your health, and be sure to contact your doctor if:    · You cannot use your shoulder.     · Your shoulder does not get better as expected. Where can you learn more? Go to http://stella-lorenzo.info/. Enter C680 in the search box to learn more about \"Shoulder Pain: Care Instructions. \"  Current as of: September 20, 2018  Content Version: 11.9  © 0917-2809 [employer]. Care instructions adapted under license by [employer] (which disclaims liability or warranty for this information). If you have questions about a medical condition or this instruction, always ask your healthcare professional. [address] any warranty or liability for your use of this information.

## 2019-02-16 NOTE — PROGRESS NOTES
[address]  [facility], [address] [place] [address]  [phone] office/[phone] fax      2/16/2019    Reason for visit:   Chief Complaint   Patient presents with    Shoulder Pain     left shoulder       Patient: Angy Smalls, 1980, [id number]       Primary MD: Dawson Lopez MD    Subjective:   Angy Smalls, a 37 y.o. male, who presents for Shoulder Pain (left shoulder)      Patient reports history of Left shoulder pain that has been getting worse. 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 He denies any recent injury, trauma, or fall. He reports Left shoulder, neck pain, and lateral back pain with radiation. He reports pain Is worse at night and he has limited ROM and not able to lift arm completely. He has had abnormal MRI Last year and referral to orthopedic surgeon of which he has not followed through with appt. He has been taking OTC Tylenol without any relief. Shoulder Pain    The history is provided by the patient. The incident occurred more than 1 week ago. The left shoulder is affected. The pain is at a severity of 8/10. The pain is moderate. The pain has been constant since onset. The pain radiates. There is a history of shoulder injury. There is no history of shoulder surgery. Associated symptoms include numbness and tingling. Past Medical History:   Diagnosis Date    HA (headache)     Migraine        No past surgical history on file.     Social History     Socioeconomic History    Marital status:      Spouse name: Not on file    Number of children: Not on file    Years of education: Not on file    Highest education level: Not on file   Social Needs    Financial resource strain: Not on file    Food insecurity - worry: Not on file    Food insecurity - inability: Not on file   [employer] needs - medical: Not on file    Transportation needs - non-medical: Not on file   Occupational History    Not on file   Tobacco Use    Smoking status: Current Some Day Smoker     Packs/day: 0.25    Smokeless tobacco: Never Used   Substance and Sexual Activity    Alcohol use: No     Alcohol/week: 0.0 oz    Drug use: No    Sexual activity: Yes     Partners: Female   Other Topics Concern    Not on file   Social History Narrative    Not on file       No Known Allergies    Current Outpatient Medications on File Prior to Visit   Medication Sig Dispense Refill    pantoprazole (PROTONIX) 40 mg tablet Take 1 Tab by mouth daily. 90 Tab 1    butalbital-acetaminophen-caffeine (FIORICET, ESGIC) -40 mg per tablet Take 1 Tab by mouth every six (6) hours as needed for Pain. 30 Tab 3     No current facility-administered medications on file prior to visit. Review of Systems   Constitutional: Negative. Respiratory: Negative. Cardiovascular: Negative. Musculoskeletal: Positive for back pain, joint pain and neck pain (Reports he has to turn his body as turning neck is painful). Negative for falls. Neurological: Positive for tingling and numbness. Negative for dizziness and headaches. Objective:   Visit Vitals  /81 (BP 1 Location: Left arm, BP Patient Position: Sitting)   Pulse 75   Temp 98 °F (36.7 °C) (Oral)   Resp 20   Ht 6' 3\" (1.905 m)   Wt 247 lb (112 kg)   SpO2 97%   BMI 30.87 kg/m²      Wt Readings from Last 3 Encounters:   02/16/19 247 lb (112 kg)   09/18/18 247 lb (112 kg)   02/27/18 239 lb (108.4 kg)     Lab Results   Component Value Date/Time    Glucose 115 (H) 01/05/2017 12:00 AM       Study Result     MRI left shoulder without contrast     Indication: Shoulder pain unspecified chronicity  Comparison: None     Technique:  Oblique Coronal T1 and T2 FSE; Axial T2 FSE and PD;  Sagittal T1  and T2 FSE.      Findings:     Rotator cuff:  No rotator cuff muscular atrophy or edema.   Mild to moderate supraspinatus and infraspinatus tendinosis. Supraspinatus focal  undersurface thickened and intermediate abnormal signal at the footplate with  adjacent small 2 mm intrasubstance fluid signal (series 5 image 17). This may  represent focal tendinosis with adjacent small intrasubstance partial tear  versus granulation of lager (8 mm) undersurface partial tear extent of which  suboptimally evaluated but probably involves 50% of the footplate. Otherwise undersurface fraying/small undersurface tear with small adjacent  intrasubstance partial-thickness tear of the posterior infraspinatus tendon  (series 5 images 12-14)  Teres minor tendon is unremarkable. Moderate subscapularis tendinosis.     Biceps: Intact intra-articular and extra-articular biceps tendon.     Cartilage and labrum: No joint effusion limiting the evaluation. Posterior  superior labral tear (series 4 image 14-15)         Osseous structures and bone marrow: Moderate proliferative degenerative changes  in the acromioclavicular joint without associated capsular hypertrophy,  inflammation and subchondral bone marrow edema. Others: Trace edema/fluid in the subacromial subdeltoid bursa. No fluid  distention. No mass. No axillary adenopathy.     IMPRESSION  Impression:     1. Mild to moderate supraspinatus and infraspinatus tendinosis. Focal  undersurface supraspinatus tendinosis with adjacent small 2 mm intrasubstance  partial tear versus granulation tissue of lager (8 mm) undersurface partial tear  extent of which suboptimally evaluated but probably involves 50% of the  footplate, favoring the latter. 2.  Otherwise undersurface fraying/small undersurface tear with small adjacent  intrasubstance partial-thickness tear of the posterior infraspinatus tendon. 3.  Moderate subscapularis tendinosis. 4.  Moderate acromioclavicular osteoarthrosis with inflammation. 5.  Posterior labral tear.           Physical Exam   Constitutional: He appears well-developed and well-nourished. He appears distressed. Pulmonary/Chest: Effort normal and breath sounds normal.   Musculoskeletal:        Left shoulder: He exhibits decreased range of motion, tenderness, pain and decreased strength. He exhibits no swelling, no effusion, no deformity and no laceration. Cervical back: He exhibits decreased range of motion, tenderness (Muscular tenderness) and pain. Vitals reviewed. ICD-10-CM ICD-9-CM    1. Left shoulder pain, unspecified chronicity M25.512 719.41 REFERRAL TO ORTHOPEDIC SURGERY      naproxen sodium (ANAPROX DS) 550 mg tablet      orphenadrine citrate (NORFLEX) 100 mg sr tablet   2. Neck pain M54.2 723.1 REFERRAL TO ORTHOPEDIC SURGERY      naproxen sodium (ANAPROX DS) 550 mg tablet      orphenadrine citrate (NORFLEX) 100 mg sr tablet   3. Abnormal finding on imaging R93.89 793.99 REFERRAL TO ORTHOPEDIC SURGERY      naproxen sodium (ANAPROX DS) 550 mg tablet      orphenadrine citrate (NORFLEX) 100 mg sr tablet   4. Muscle spasm of back M62.830 724.8      - Discussed abnormal MRI results and the need to follow up with orthopedic specialists. - Refilled NSAIDS and Muscle relaxants. I have discussed the diagnosis with the patient and the intended plan as seen in the above orders. The patient has received an after-visit summary and questions were answered concerning future plans. I have discussed medication side effects and warnings with the patient as well. Patient agreeable with above plan and verbalizes understanding. Medications Discontinued During This Encounter   Medication Reason    naproxen sodium (ANAPROX DS) 550 mg tablet Reorder    orphenadrine citrate (NORFLEX) 100 mg sr tablet Reorder     Follow-up Disposition:  Return if symptoms worsen or fail to improve, for Follow up with Orthopedic Surgeon.

## 2019-02-16 NOTE — PROGRESS NOTES
Chief Complaint   Patient presents with    Shoulder Pain     left shoulder     1. Have you been to the ER, urgent care clinic since your last visit? Hospitalized since your last visit? No    2. Have you seen or consulted any other health care providers outside of the [address] [place] [address] since your last visit? Include any pap smears or colon screening.  No

## 2020-08-27 ENCOUNTER — TELEPHONE (OUTPATIENT)
Dept: FAMILY MEDICINE CLINIC | Age: 40
End: 2020-08-27

## 2020-08-27 NOTE — TELEPHONE ENCOUNTER
Patient stated he ate spicy food last night and had a stomach pains so he ate Tums. Patient stated He vomitted late last night. Patient stated he had stomachpain while using the bathroom. Patient stated he is having pain on left side of body under ribs and into his back. He stated it hurts to breathe.

## 2020-09-22 ENCOUNTER — VIRTUAL VISIT (OUTPATIENT)
Dept: FAMILY MEDICINE CLINIC | Age: 40
End: 2020-09-22

## 2020-09-22 NOTE — PROGRESS NOTES
Pt texted; no connection made; will close encounter at this time. This encounter was created in error - please disregard.